# Patient Record
Sex: MALE | Race: WHITE | NOT HISPANIC OR LATINO
[De-identification: names, ages, dates, MRNs, and addresses within clinical notes are randomized per-mention and may not be internally consistent; named-entity substitution may affect disease eponyms.]

---

## 2019-06-13 ENCOUNTER — APPOINTMENT (OUTPATIENT)
Dept: INTERNAL MEDICINE | Facility: CLINIC | Age: 24
End: 2019-06-13

## 2019-06-17 ENCOUNTER — NON-APPOINTMENT (OUTPATIENT)
Age: 24
End: 2019-06-17

## 2019-06-17 ENCOUNTER — APPOINTMENT (OUTPATIENT)
Dept: INTERNAL MEDICINE | Facility: CLINIC | Age: 24
End: 2019-06-17
Payer: MEDICAID

## 2019-06-17 VITALS
HEIGHT: 69 IN | SYSTOLIC BLOOD PRESSURE: 126 MMHG | DIASTOLIC BLOOD PRESSURE: 74 MMHG | TEMPERATURE: 98.1 F | WEIGHT: 248 LBS | BODY MASS INDEX: 36.73 KG/M2 | HEART RATE: 81 BPM | RESPIRATION RATE: 14 BRPM | OXYGEN SATURATION: 97 %

## 2019-06-17 DIAGNOSIS — Z78.9 OTHER SPECIFIED HEALTH STATUS: ICD-10-CM

## 2019-06-17 DIAGNOSIS — Z82.49 FAMILY HISTORY OF ISCHEMIC HEART DISEASE AND OTHER DISEASES OF THE CIRCULATORY SYSTEM: ICD-10-CM

## 2019-06-17 DIAGNOSIS — Z83.438 FAMILY HISTORY OF OTHER DISORDER OF LIPOPROTEIN METABOLISM AND OTHER LIPIDEMIA: ICD-10-CM

## 2019-06-17 DIAGNOSIS — J06.9 ACUTE UPPER RESPIRATORY INFECTION, UNSPECIFIED: ICD-10-CM

## 2019-06-17 DIAGNOSIS — J02.9 ACUTE PHARYNGITIS, UNSPECIFIED: ICD-10-CM

## 2019-06-17 DIAGNOSIS — Z82.5 FAMILY HISTORY OF ASTHMA AND OTHER CHRONIC LOWER RESPIRATORY DISEASES: ICD-10-CM

## 2019-06-17 DIAGNOSIS — Z81.8 FAMILY HISTORY OF OTHER MENTAL AND BEHAVIORAL DISORDERS: ICD-10-CM

## 2019-06-17 DIAGNOSIS — Z80.1 FAMILY HISTORY OF MALIGNANT NEOPLASM OF TRACHEA, BRONCHUS AND LUNG: ICD-10-CM

## 2019-06-17 DIAGNOSIS — Z80.8 FAMILY HISTORY OF MALIGNANT NEOPLASM OF OTHER ORGANS OR SYSTEMS: ICD-10-CM

## 2019-06-17 LAB — S PYO AG SPEC QL IA: NORMAL

## 2019-06-17 PROCEDURE — 93000 ELECTROCARDIOGRAM COMPLETE: CPT

## 2019-06-17 PROCEDURE — 99213 OFFICE O/P EST LOW 20 MIN: CPT | Mod: 25

## 2019-06-17 PROCEDURE — 36415 COLL VENOUS BLD VENIPUNCTURE: CPT

## 2019-06-17 PROCEDURE — 99385 PREV VISIT NEW AGE 18-39: CPT | Mod: 25

## 2019-06-17 PROCEDURE — 87880 STREP A ASSAY W/OPTIC: CPT | Mod: QW

## 2019-06-17 NOTE — ASSESSMENT
[FreeTextEntry1] : asthma: Controlled. On albuterol inhaler. \par Anxiety, depression: Denies SI. On lamictal, lexapro. Check EKG. \par URI: Rapid strep negative. Most likely viral. Recommended advil cold and sinus PRN. \par HCM: Check labs. Declined HIV. Requested immunizations for review. \par

## 2019-06-17 NOTE — HISTORY OF PRESENT ILLNESS
[Initial Evaluation, This Episode] : an initial evaluation of this episode of [Nasal Congestion] : nasal congestion [Runny Nose] : runny nose [Sore Throat] : sore throat [Dry Cough] : dry cough [Gradual] : gradual [___ Day(s) Ago] : [unfilled] day(s) ago [Frequent] : frequently [Daily] : occur daily [Moderate] : moderate in severity [Worsening] : worsening [FreeTextEntry1] : CPE and rhinorrhea [Fever] : no fever [Nausea] : no nausea [Chills] : no chills [Myalgias] : no myalgias [Vomiting] : no vomiting [Diarrhea] : no diarrhea

## 2019-06-17 NOTE — PHYSICAL EXAM
[No Acute Distress] : no acute distress [Well Nourished] : well nourished [Well Developed] : well developed [Well-Appearing] : well-appearing [PERRL] : pupils equal round and reactive to light [Normal Sclera/Conjunctiva] : normal sclera/conjunctiva [EOMI] : extraocular movements intact [Normal Outer Ear/Nose] : the outer ears and nose were normal in appearance [Supple] : supple [No Lymphadenopathy] : no lymphadenopathy [Thyroid Normal, No Nodules] : the thyroid was normal and there were no nodules present [No Respiratory Distress] : no respiratory distress  [No Accessory Muscle Use] : no accessory muscle use [Clear to Auscultation] : lungs were clear to auscultation bilaterally [Normal Rate] : normal rate  [Regular Rhythm] : with a regular rhythm [Normal S1, S2] : normal S1 and S2 [No Murmur] : no murmur heard [No Edema] : there was no peripheral edema [Soft] : abdomen soft [Non Tender] : non-tender [Non-distended] : non-distended [Normal Posterior Cervical Nodes] : no posterior cervical lymphadenopathy [Normal Bowel Sounds] : normal bowel sounds [Normal Anterior Cervical Nodes] : no anterior cervical lymphadenopathy [No CVA Tenderness] : no CVA  tenderness [No Spinal Tenderness] : no spinal tenderness [No Joint Swelling] : no joint swelling [Grossly Normal Strength/Tone] : grossly normal strength/tone [No Rash] : no rash [Normal Gait] : normal gait [Coordination Grossly Intact] : coordination grossly intact [No Focal Deficits] : no focal deficits [Deep Tendon Reflexes (DTR)] : deep tendon reflexes were 2+ and symmetric [Normal Affect] : the affect was normal [Normal Insight/Judgement] : insight and judgment were intact [de-identified] : PND

## 2019-06-17 NOTE — REVIEW OF SYSTEMS
[Headache] : headache [Nasal Discharge] : nasal discharge [Cough] : cough [Depression] : depression [Anxiety] : anxiety [Fever] : no fever [Discharge] : no discharge [Night Sweats] : no night sweats [Chills] : no chills [Earache] : no earache [Vision Problems] : no vision problems [Itching] : no itching [Sore Throat] : no sore throat [Palpitations] : no palpitations [Chest Pain] : no chest pain [Shortness Of Breath] : no shortness of breath [Lower Ext Edema] : no lower extremity edema [Wheezing] : no wheezing [Dyspnea on Exertion] : not dyspnea on exertion [Nausea] : no nausea [Abdominal Pain] : no abdominal pain [Constipation] : no constipation [Diarrhea] : no diarrhea [Vomiting] : no vomiting [Muscle Weakness] : no muscle weakness [Dysuria] : no dysuria [Muscle Pain] : no muscle pain [Skin Rash] : no skin rash [Mole Changes] : no mole changes [Dizziness] : no dizziness [Suicidal] : not suicidal [Swollen Glands] : no swollen glands [Easy Bruising] : no easy bruising [Easy Bleeding] : no easy bleeding

## 2019-06-17 NOTE — HEALTH RISK ASSESSMENT
[Good] : ~his/her~  mood as  good [No falls in past year] : Patient reported no falls in the past year [0] : 2) Feeling down, depressed, or hopeless: Not at all (0) [Student] : student [Single] : single [Fully functional (using the telephone, shopping, preparing meals, housekeeping, doing laundry, using] : Fully functional and needs no help or supervision to perform IADLs (using the telephone, shopping, preparing meals, housekeeping, doing laundry, using transportation, managing medications and managing finances) [Fully functional (bathing, dressing, toileting, transferring, walking, feeding)] : Fully functional (bathing, dressing, toileting, transferring, walking, feeding) [Smoke Detector] : smoke detector [Seat Belt] :  uses seat belt [Carbon Monoxide Detector] : carbon monoxide detector [[id number]] : 0 [] : No [Change in mental status noted] : No change in mental status noted [HIV test declined] : HIV test declined [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

## 2019-06-18 ENCOUNTER — RESULT REVIEW (OUTPATIENT)
Age: 24
End: 2019-06-18

## 2019-06-18 LAB
ALBUMIN SERPL ELPH-MCNC: 4.8 G/DL
ALP BLD-CCNC: 61 U/L
ALT SERPL-CCNC: 63 U/L
ANION GAP SERPL CALC-SCNC: 14 MMOL/L
AST SERPL-CCNC: 26 U/L
BASOPHILS # BLD AUTO: 0.16 K/UL
BASOPHILS NFR BLD AUTO: 1.7 %
BILIRUB SERPL-MCNC: 0.7 MG/DL
BUN SERPL-MCNC: 17 MG/DL
CALCIUM SERPL-MCNC: 10.3 MG/DL
CHLORIDE SERPL-SCNC: 102 MMOL/L
CHOLEST SERPL-MCNC: 180 MG/DL
CHOLEST/HDLC SERPL: 4.9 RATIO
CO2 SERPL-SCNC: 24 MMOL/L
CREAT SERPL-MCNC: 1.12 MG/DL
EOSINOPHIL # BLD AUTO: 0.88 K/UL
EOSINOPHIL NFR BLD AUTO: 9.3 %
ESTIMATED AVERAGE GLUCOSE: 126 MG/DL
GLUCOSE SERPL-MCNC: 94 MG/DL
HBA1C MFR BLD HPLC: 6 %
HCT VFR BLD CALC: 50.1 %
HDLC SERPL-MCNC: 37 MG/DL
HGB BLD-MCNC: 16.4 G/DL
IMM GRANULOCYTES NFR BLD AUTO: 0.4 %
LDLC SERPL CALC-MCNC: 88 MG/DL
LYMPHOCYTES # BLD AUTO: 3.62 K/UL
LYMPHOCYTES NFR BLD AUTO: 38.2 %
MAN DIFF?: NORMAL
MCHC RBC-ENTMCNC: 27.7 PG
MCHC RBC-ENTMCNC: 32.7 GM/DL
MCV RBC AUTO: 84.6 FL
MONOCYTES # BLD AUTO: 0.73 K/UL
MONOCYTES NFR BLD AUTO: 7.7 %
NEUTROPHILS # BLD AUTO: 4.05 K/UL
NEUTROPHILS NFR BLD AUTO: 42.7 %
PLATELET # BLD AUTO: 402 K/UL
POTASSIUM SERPL-SCNC: 4.9 MMOL/L
PROT SERPL-MCNC: 7.6 G/DL
RBC # BLD: 5.92 M/UL
RBC # FLD: 12.5 %
SODIUM SERPL-SCNC: 140 MMOL/L
TRIGL SERPL-MCNC: 274 MG/DL
TSH SERPL-ACNC: 2.06 UIU/ML
WBC # FLD AUTO: 9.48 K/UL

## 2020-04-06 ENCOUNTER — TRANSCRIPTION ENCOUNTER (OUTPATIENT)
Age: 25
End: 2020-04-06

## 2020-04-06 ENCOUNTER — APPOINTMENT (OUTPATIENT)
Dept: INTERNAL MEDICINE | Facility: CLINIC | Age: 25
End: 2020-04-06
Payer: COMMERCIAL

## 2020-04-06 PROCEDURE — 99213 OFFICE O/P EST LOW 20 MIN: CPT

## 2020-04-06 NOTE — REVIEW OF SYSTEMS
[Negative] : Psychiatric [Fever] : no fever [Chills] : no chills [Fatigue] : no fatigue [Night Sweats] : no night sweats [Recent Change In Weight] : ~T no recent weight change [Cough] : no cough [FreeTextEntry6] : chest feels tight like he can't get a deep breath

## 2020-04-06 NOTE — HISTORY OF PRESENT ILLNESS
[Home] : at home, [unfilled] , at the time of the visit. [Medical Office: ([facility])___] : at ~his/her~ medical office located in [place] [Mother] : mother [Patient] : the patient [FreeTextEntry8] : c/o 3 days of feeling like he can't get a deep breath. No fever or cough. He works at [employer] and has been wearing a mask all day which  makes it harder to breathe. No calf swelling, palpitations, diaphoresis. No FH heart disease. Nonsmoker.  He used his mother's albuterol inhaler and helped for a little while. \par \par he has elevated ALT and was not retested because he was told to hold tylenol and he takes it 2-3 times a week for headaches.

## 2020-04-06 NOTE — ASSESSMENT
[FreeTextEntry1] : asthma\par will send rx for albuterol inhaler\par he wants to be evaluated at [facility] and could not get in today, but will try tomorrow\par stay home from work today and tomorrow\par \par elevated LFT's\par advised to f/u with Dr De La Cruz when things improve\par \par anxiety and depression\par continue meds

## 2020-04-06 NOTE — PHYSICAL EXAM
[No Respiratory Distress] : no respiratory distress  [No Accessory Muscle Use] : no accessory muscle use [Normal] : affect was normal and insight and judgment were intact

## 2020-04-20 ENCOUNTER — APPOINTMENT (OUTPATIENT)
Dept: INTERNAL MEDICINE | Facility: CLINIC | Age: 25
End: 2020-04-20
Payer: COMMERCIAL

## 2020-04-20 PROCEDURE — 99441: CPT

## 2020-04-20 RX ORDER — AZITHROMYCIN 250 MG/1
250 TABLET, FILM COATED ORAL
Qty: 1 | Refills: 0 | Status: DISCONTINUED | COMMUNITY
Start: 2020-04-10 | End: 2020-04-20

## 2020-04-20 RX ORDER — MONTELUKAST 10 MG/1
10 TABLET, FILM COATED ORAL
Qty: 30 | Refills: 5 | Status: ACTIVE | COMMUNITY
Start: 2020-04-20 | End: 1900-01-01

## 2020-04-22 ENCOUNTER — APPOINTMENT (OUTPATIENT)
Dept: PULMONOLOGY | Facility: CLINIC | Age: 25
End: 2020-04-22
Payer: COMMERCIAL

## 2020-04-22 PROCEDURE — 99204 OFFICE O/P NEW MOD 45 MIN: CPT | Mod: 95

## 2020-04-24 ENCOUNTER — APPOINTMENT (OUTPATIENT)
Dept: PULMONOLOGY | Facility: CLINIC | Age: 25
End: 2020-04-24
Payer: COMMERCIAL

## 2020-04-24 ENCOUNTER — RX CHANGE (OUTPATIENT)
Age: 25
End: 2020-04-24

## 2020-04-24 PROCEDURE — 99203 OFFICE O/P NEW LOW 30 MIN: CPT | Mod: 95

## 2020-04-24 RX ORDER — MOMETASONE FUROATE 100 UG/1
100 AEROSOL RESPIRATORY (INHALATION) TWICE DAILY
Qty: 1 | Refills: 0 | Status: DISCONTINUED | COMMUNITY
Start: 2020-04-08 | End: 2020-04-24

## 2020-04-24 RX ORDER — AZELASTINE HYDROCHLORIDE 205.5 UG/1
0.15 SPRAY, METERED NASAL DAILY
Qty: 30 | Refills: 1 | Status: DISCONTINUED | COMMUNITY
Start: 2020-04-24 | End: 2020-04-24

## 2020-04-24 NOTE — HISTORY OF PRESENT ILLNESS
[Home] : at home, [unfilled] , at the time of the visit. [Other Location: e.g. Home (Enter Location, City,State)___] : at [unfilled] [Patient] : the patient [Self] : self [TextBox_4] : [facility] pt [facility] Da Silva and Nickie NP\par \par \par 25-year-old gentleman with history of asthma, anxiety, he complains of mild shortness of breath for the last 3 weeks, he has no chest pain, cough, fever or chills, he does not complain of any nocturnal respiratory symptoms. \par  \par he is a nonsmoker, he is currenlt out of work (works at [employer]) Has one dog and one cat at home\par recently given zpack, medrol pack and using ventolin at least twice daily and pulmicort

## 2020-04-24 NOTE — DISCUSSION/SUMMARY
[FreeTextEntry1] : ---Assessment plan----------The patient has been referred here for further opinion regarding pulmonary problem,\par 26 yo w/h/o asthma, obesity \par 1) complete medrol pack the take prednisone 10mg\par 2) completed zpack\par 3) will need allergy labs- rx mailed to pt\par 4) needs CXR\par 5) change pulmicort to symbicort, continue ventolin/nebs as needed- reviewed proper inhaler technique\par 6)he has features of teresa- will need psg\par 7) h/o elevated LFTs- recheck labs\par 8) reviewed covid precautions\par 9) f/u in 10 days\par \par Thanks for allowing  me to participate  in the care of this patient.  Patient at this time  will follow  the above mentioned recommendations and return back for follow up visit. If you have any questions  I can be reached  at #[phone] (beeper)  or  [phone] (office).\par \par Steven Da Silva MD, FCCP \par Director, Pulmonary Hypertension Program \par [facility] \par Division of Pulmonary, Critical Care and Sleep Medicine \par  Professor of Medicine \par [employer]\par

## 2020-04-24 NOTE — REVIEW OF SYSTEMS
[Nasal Congestion] : nasal congestion [Cough] : cough [SOB on Exertion] : sob on exertion [Anxiety] : anxiety [Negative] : Endocrine

## 2020-04-27 ENCOUNTER — RX CHANGE (OUTPATIENT)
Age: 25
End: 2020-04-27

## 2020-04-27 RX ORDER — AZELASTINE HYDROCHLORIDE 205.5 UG/1
0.15 SPRAY, METERED NASAL DAILY
Qty: 30 | Refills: 1 | Status: DISCONTINUED | COMMUNITY
Start: 2020-04-24 | End: 2020-04-27

## 2020-05-04 ENCOUNTER — APPOINTMENT (OUTPATIENT)
Dept: PULMONOLOGY | Facility: CLINIC | Age: 25
End: 2020-05-04
Payer: COMMERCIAL

## 2020-05-04 PROCEDURE — 99443: CPT

## 2020-05-04 NOTE — DISCUSSION/SUMMARY
[FreeTextEntry1] : ---Assessment plan----------The patient has been referred here for further opinion regarding pulmonary problem,\par 26 yo w/h/o asthma, obesity \par 1) dyspnea-prednisone 15mg x few days then back to 10mg\par 2) completed zpack\par 3) will need allergy labs- rx mailed to pt\par 4) s/p cxr- will obtain result from [facility]\par 5) continue symbicort, continue ventolin/nebs as needed\par 6)he has features of teresa- will need psg\par 7) advised not to work at this time\par 8) reviewed covid precautions\par 9) f/u in 2 weeks\par \par Thanks for allowing  me to participate  in the care of this patient.  Patient at this time  will follow  the above mentioned recommendations and return back for follow up visit. If you have any questions  I can be reached  at #[phone] (beeper)  or  [phone] (office).\par \par Steven Da Silva MD, FCCP \par Director, Pulmonary Hypertension Program \par [facility] \par Division of Pulmonary, Critical Care and Sleep Medicine \par  Professor of Medicine \par [employer]\par

## 2020-05-04 NOTE — REVIEW OF SYSTEMS
[SOB on Exertion] : sob on exertion [Nasal Congestion] : nasal congestion [Cough] : cough [Anxiety] : anxiety [Negative] : Endocrine

## 2020-05-04 NOTE — HISTORY OF PRESENT ILLNESS
[Home] : at home, [unfilled] , at the time of the visit. [Other Location: e.g. Home (Enter Location, City,State)___] : at [unfilled] [Self] : self [Patient] : the patient [TextBox_4] : [facility] Dr Da Silva and Nickie NP\par \par \par 25-year-old gentleman with history of asthma, anxiety, he complains of mild shortness of breath for the last 3 weeks, he has no chest pain, cough, fever or chills, he does not complain of any nocturnal respiratory symptoms. \par  \par he is a nonsmoker, he is currenlt out of work (works at [employer]) Has one dog and one cat at home\par recently given zpack, medrol pack and using ventolin at least twice daily and pulmicort\par \par 5/4/2020 pt is feeling much better -breathing has improved but still having episodes of dyspnea

## 2020-05-08 ENCOUNTER — RX CHANGE (OUTPATIENT)
Age: 25
End: 2020-05-08

## 2020-05-08 RX ORDER — AZELASTINE HYDROCHLORIDE 205.5 UG/1
0.15 SPRAY, METERED NASAL
Qty: 1 | Refills: 1 | Status: DISCONTINUED | COMMUNITY
Start: 2020-05-08 | End: 2020-05-08

## 2020-05-08 RX ORDER — AZELASTINE HYDROCHLORIDE 205.5 UG/1
0.15 SPRAY, METERED NASAL
Qty: 30 | Refills: 1 | Status: DISCONTINUED | COMMUNITY
Start: 2020-04-27 | End: 2020-05-08

## 2020-05-12 ENCOUNTER — NON-APPOINTMENT (OUTPATIENT)
Age: 25
End: 2020-05-12

## 2020-05-18 NOTE — HISTORY OF PRESENT ILLNESS
[TextBox_4] : Victro Hugo is a pleasant 25-year-old gentleman with history of asthma, anxiety, he complains of mild shortness of breath for the last 3 weeks, he has no chest pain, cough, fever or chills, he does not complain of any nocturnal respiratory symptoms.\par \par \par This visit was provided via telehealth using real-time 2-way audio visual technology.  The patient, REMY SALES, was located at home, [address] [place] [address]\[place] [address] at the time of the visit.  \par The provider, Ciera Da Silva, was located at the office [address], [address], [place], [place] at the time of the visit. \par The patient, Mr. REMY SALES  and physician Ciera Da Silva DO, participated in the telehealth encounter.\par \par Verbal consent was obtained by the  from patient.\par

## 2020-05-18 NOTE — ASSESSMENT
[FreeTextEntry1] : I advised him to increase Pulmicort to 2 puffs twice a day\par I advised him to continue to use the Singulair\par I am starting him on Zpak and Medrol dose pack at this point\par I advised him to calm into the office for followup in 2 weeks

## 2020-05-18 NOTE — DISCUSSION/SUMMARY
[FreeTextEntry1] : Victor Hugo is a patient with mild shortness of breath, likely secondary to seasonal allergies and mild asthma exacerbation

## 2020-05-19 ENCOUNTER — APPOINTMENT (OUTPATIENT)
Dept: PULMONOLOGY | Facility: CLINIC | Age: 25
End: 2020-05-19

## 2020-05-19 ENCOUNTER — APPOINTMENT (OUTPATIENT)
Dept: PULMONOLOGY | Facility: CLINIC | Age: 25
End: 2020-05-19
Payer: COMMERCIAL

## 2020-05-19 PROCEDURE — 99443: CPT

## 2020-05-19 NOTE — HISTORY OF PRESENT ILLNESS
[Home] : at home, [unfilled] , at the time of the visit. [Other Location: e.g. Home (Enter Location, City,State)___] : at [unfilled] [Patient] : the patient [Self] : self [TextBox_4] : [facility] Da Silva and Nickie NP\par \par \par 25-year-old gentleman with history of asthma, anxiety, he complains of mild shortness of breath for the last 3 weeks, he has no chest pain, cough, fever or chills, he does not complain of any nocturnal respiratory symptoms. \par  \par he is a nonsmoker, he is currenlt out of work (works at [employer]) Has one dog and one cat at home\par recently given zpack, medrol pack and using ventolin at least twice daily and pulmicort\par \par 5/4/2020  -   pt is feeling much better -breathing has improved but still having episodes of dyspnea - \par \par may 19  -2020 -----  back to base line--on symbicort, proventil, singulair----needs labd

## 2020-05-19 NOTE — DISCUSSION/SUMMARY
[FreeTextEntry1] : ---Assessment plan----------The patient has been referred here for further opinion regarding pulmonary problem,\par 26 yo w/h/o asthma  well controlled  now, obesity , \par 1) dyspnea-improved ---- stop prednisone , contd symbicort \par 2) completed zpack\par 3) will need allergy labs- rx mailed to pt\par 4) s/p cxr- will obtain result from [facility]\par 5) continue symbicort, continue ventolin/nebs as needed\par 6)he has features of teresa- will need psg\par 7) advised not to work at this time\par 8) reviewed covid precautions \par 9) f/u in 8 weeks\par \par Thanks for allowing  me to participate  in the care of this patient.  Patient at this time  will follow  the above mentioned recommendations and return back for follow up visit. If you have any questions  I can be reached  at #[phone] (beeper)  or  [phone] (office).\par \par Steven Da Silva MD, FCCP \par Director, Pulmonary Hypertension Program \par [facility] \par Division of Pulmonary, Critical Care and Sleep Medicine \par  Professor of Medicine \par [employer] [employer]\par

## 2020-05-21 RX ORDER — BUDESONIDE AND FORMOTEROL FUMARATE DIHYDRATE 80; 4.5 UG/1; UG/1
80-4.5 AEROSOL RESPIRATORY (INHALATION) TWICE DAILY
Qty: 1 | Refills: 1 | Status: DISCONTINUED | COMMUNITY
Start: 2020-04-24 | End: 2020-05-21

## 2020-08-14 ENCOUNTER — APPOINTMENT (OUTPATIENT)
Dept: DISASTER EMERGENCY | Facility: CLINIC | Age: 25
End: 2020-08-14

## 2020-08-14 DIAGNOSIS — Z01.818 ENCOUNTER FOR OTHER PREPROCEDURAL EXAMINATION: ICD-10-CM

## 2020-08-15 LAB — SARS-COV-2 N GENE NPH QL NAA+PROBE: NOT DETECTED

## 2020-08-17 ENCOUNTER — RX RENEWAL (OUTPATIENT)
Age: 25
End: 2020-08-17

## 2020-08-18 ENCOUNTER — APPOINTMENT (OUTPATIENT)
Dept: PULMONOLOGY | Facility: CLINIC | Age: 25
End: 2020-08-18
Payer: COMMERCIAL

## 2020-08-18 ENCOUNTER — LABORATORY RESULT (OUTPATIENT)
Age: 25
End: 2020-08-18

## 2020-08-18 VITALS
BODY MASS INDEX: 39.87 KG/M2 | TEMPERATURE: 97.8 F | HEART RATE: 66 BPM | WEIGHT: 254 LBS | HEIGHT: 67 IN | OXYGEN SATURATION: 99 %

## 2020-08-18 VITALS — SYSTOLIC BLOOD PRESSURE: 122 MMHG | HEART RATE: 71 BPM | RESPIRATION RATE: 16 BRPM | DIASTOLIC BLOOD PRESSURE: 79 MMHG

## 2020-08-18 DIAGNOSIS — R06.83 SNORING: ICD-10-CM

## 2020-08-18 LAB
ALBUMIN SERPL ELPH-MCNC: 4.9 G/DL
ALP BLD-CCNC: 57 U/L
ALT SERPL-CCNC: 50 U/L
ANION GAP SERPL CALC-SCNC: 14 MMOL/L
AST SERPL-CCNC: 27 U/L
BASOPHILS # BLD AUTO: 0.15 K/UL
BASOPHILS NFR BLD AUTO: 1.7 %
BILIRUB SERPL-MCNC: 0.6 MG/DL
BUN SERPL-MCNC: 22 MG/DL
CALCIUM SERPL-MCNC: 10.5 MG/DL
CHLORIDE SERPL-SCNC: 104 MMOL/L
CO2 SERPL-SCNC: 23 MMOL/L
CREAT SERPL-MCNC: 1.01 MG/DL
DEPRECATED KAPPA LC FREE/LAMBDA SER: 1.2 RATIO
EOSINOPHIL # BLD AUTO: 0.75 K/UL
EOSINOPHIL NFR BLD AUTO: 8.5 %
GLUCOSE SERPL-MCNC: 96 MG/DL
HCT VFR BLD CALC: 47.6 %
HGB BLD-MCNC: 15.6 G/DL
IGA SER QL IEP: 424 MG/DL
IGG SER QL IEP: 1066 MG/DL
IGM SER QL IEP: 43 MG/DL
IMM GRANULOCYTES NFR BLD AUTO: 0.1 %
KAPPA LC CSF-MCNC: 1.29 MG/DL
KAPPA LC SERPL-MCNC: 1.55 MG/DL
LYMPHOCYTES # BLD AUTO: 2.75 K/UL
LYMPHOCYTES NFR BLD AUTO: 31.3 %
MAN DIFF?: NORMAL
MCHC RBC-ENTMCNC: 27.5 PG
MCHC RBC-ENTMCNC: 32.8 GM/DL
MCV RBC AUTO: 83.8 FL
MONOCYTES # BLD AUTO: 0.71 K/UL
MONOCYTES NFR BLD AUTO: 8.1 %
NEUTROPHILS # BLD AUTO: 4.43 K/UL
NEUTROPHILS NFR BLD AUTO: 50.3 %
PLATELET # BLD AUTO: 364 K/UL
POTASSIUM SERPL-SCNC: 4.5 MMOL/L
PROT SERPL-MCNC: 7.3 G/DL
RBC # BLD: 5.68 M/UL
RBC # FLD: 12.7 %
SODIUM SERPL-SCNC: 140 MMOL/L
WBC # FLD AUTO: 8.8 K/UL

## 2020-08-18 PROCEDURE — 99215 OFFICE O/P EST HI 40 MIN: CPT | Mod: 25

## 2020-08-18 PROCEDURE — 36415 COLL VENOUS BLD VENIPUNCTURE: CPT

## 2020-08-18 PROCEDURE — 94726 PLETHYSMOGRAPHY LUNG VOLUMES: CPT

## 2020-08-18 PROCEDURE — ZZZZZ: CPT

## 2020-08-18 PROCEDURE — 94729 DIFFUSING CAPACITY: CPT

## 2020-08-18 PROCEDURE — 94010 BREATHING CAPACITY TEST: CPT

## 2020-08-18 RX ORDER — AZITHROMYCIN 250 MG/1
250 TABLET, FILM COATED ORAL
Qty: 6 | Refills: 0 | Status: DISCONTINUED | COMMUNITY
Start: 2020-04-22 | End: 2020-08-18

## 2020-08-18 NOTE — HISTORY OF PRESENT ILLNESS
[TextBox_4] : \par \par \par 25-year-old gentleman with history of asthma, anxiety, he complains of mild shortness of breath for the last 3 weeks, he has no chest pain, cough, fever or chills, he does not complain of any nocturnal respiratory symptoms. \par  \par he is a nonsmoker, he is currenlt out of work (works at [employer]) Has one dog and one cat at home\par recently given zpack, medrol pack and using ventolin at least twice daily and pulmicort\par \par 5/4/2020  -   pt is feeling much better -breathing has improved but still having episodes of dyspnea - \par \par may 19  -2020 -----  back to base line--on symbicort, proventil, singulair----needs labd\par \par pft 8/2020 normal lung volumes\par \par august 2020---   Feels much better---was  on Proventil, Singulair, Symbicort/Advair------------- since he has improved we will stop taking the inhaled steroids-----has not done the sleep study\par

## 2020-08-18 NOTE — DISCUSSION/SUMMARY
[FreeTextEntry1] : ---Assessment plan----------The patient has been referred here for further opinion regarding pulmonary problem,\par 26 yo w/h/o asthma  well controlled  now, obesity , \par 1) dyspnea-improved ---- stop prednisone , contd symbicort albuterol as needed\par 2) labs drawn in our office today\par 3) )he has features of teresa-awaiting sleep study\par 4) f/u in 3 months\par \par \par Thanks for allowing  me to participate  in the care of this patient.  Patient at this time  will follow  the above mentioned recommendations and return back for follow up visit. If you have any questions  I can be reached  at #[phone] (beeper)  or  [phone] (office).\par \par Steven Da Silva MD, FCCP \par Director, Pulmonary Hypertension Program \par [facility] \par Division of Pulmonary, Critical Care and Sleep Medicine \par  Professor of Medicine \par [employer]\par

## 2020-08-18 NOTE — PHYSICAL EXAM
[No Acute Distress] : no acute distress [IV] : Mallampati Class: IV [No Neck Mass] : no neck mass [Clear to Auscultation Bilaterally] : clear to auscultation bilaterally [Normal S1, S2] : normal s1, s2 [Benign] : benign [No Abnormalities] : no abnormalities [No Clubbing] : no clubbing [Normal Color/ Pigmentation] : normal color/ pigmentation [Oriented x3] : oriented x3 [No Focal Deficits] : no focal deficits

## 2020-08-19 LAB
SARS-COV-2 IGG SERPL IA-ACNC: 0.08 INDEX
SARS-COV-2 IGG SERPL QL IA: NEGATIVE

## 2020-08-20 LAB
M TB IFN-G BLD-IMP: NEGATIVE
QUANTIFERON TB PLUS MITOGEN MINUS NIL: >10 IU/ML
QUANTIFERON TB PLUS NIL: 0.01 IU/ML
QUANTIFERON TB PLUS TB1 MINUS NIL: 0 IU/ML
QUANTIFERON TB PLUS TB2 MINUS NIL: 0 IU/ML

## 2020-08-21 LAB — TOTAL IGE SMQN RAST: 508 KU/L

## 2020-08-24 LAB
ALTERN TENCAPG(M6): <2 MCG/ML
ASPER FUMCAPG(M3): 7 MCG/ML
AUREOBASCAPG(M12): 2.6 MCG/ML
MICROPOLYCAPG(M22): <2 MCG/ML
PENIC CHRYCAPG(M1): 5.3 MCG/ML
PHOMA BETAE IGG: <2 MCG/ML
THERMOCAPG(M23): 2.4 MCG/ML
TRICHODERMA VIRIDE IGG: <2 MCG/ML

## 2020-08-25 LAB
A ALTERNATA IGE QN: <0.1 KUA/L
A FUMIGATUS IGE QN: <0.1 KUA/L
BERMUDA GRASS IGE QN: 0.2 KUA/L
BOXELDER IGE QN: 0.71 KUA/L
C HERBARUM IGE QN: <0.1 KUA/L
CALIF WALNUT IGE QN: 1.12 KUA/L
CAT DANDER IGE QN: 29.5 KUA/L
CMN PIGWEED IGE QN: 0.14 KUA/L
COMMON RAGWEED IGE QN: 1.04 KUA/L
COTTONWOOD IGE QN: 0.35 KUA/L
D FARINAE IGE QN: 1.42 KUA/L
D PTERONYSS IGE QN: 1.58 KUA/L
DEPRECATED A ALTERNATA IGE RAST QL: 0
DEPRECATED A FUMIGATUS IGE RAST QL: 0
DEPRECATED BERMUDA GRASS IGE RAST QL: NORMAL
DEPRECATED BOXELDER IGE RAST QL: 2
DEPRECATED C HERBARUM IGE RAST QL: 0
DEPRECATED CAT DANDER IGE RAST QL: 4
DEPRECATED COMMON PIGWEED IGE RAST QL: NORMAL
DEPRECATED COMMON RAGWEED IGE RAST QL: 2
DEPRECATED COTTONWOOD IGE RAST QL: 1
DEPRECATED D FARINAE IGE RAST QL: 2
DEPRECATED D PTERONYSS IGE RAST QL: 2
DEPRECATED DOG DANDER IGE RAST QL: 4
DEPRECATED GOOSEFOOT IGE RAST QL: NORMAL
DEPRECATED LONDON PLANE IGE RAST QL: NORMAL
DEPRECATED MUGWORT IGE RAST QL: NORMAL
DEPRECATED P NOTATUM IGE RAST QL: 0
DEPRECATED RED CEDAR IGE RAST QL: 1
DEPRECATED ROACH IGE RAST QL: 1
DEPRECATED SHEEP SORREL IGE RAST QL: NORMAL
DEPRECATED SILVER BIRCH IGE RAST QL: 2
DEPRECATED TIMOTHY IGE RAST QL: 1
DEPRECATED WHITE ASH IGE RAST QL: 2
DEPRECATED WHITE OAK IGE RAST QL: 2
DOG DANDER IGE QN: 30.4 KUA/L
GOOSEFOOT IGE QN: 0.33 KUA/L
LONDON PLANE IGE QN: 0.23 KUA/L
MUGWORT IGE QN: 0.33 KUA/L
MULBERRY (T70) CLASS: NORMAL
MULBERRY (T70) CONC: 0.19 KUA/L
P NOTATUM IGE QN: <0.1 KUA/L
RED CEDAR IGE QN: 0.37 KUA/L
ROACH IGE QN: 0.57 KUA/L
SHEEP SORREL IGE QN: 0.18 KUA/L
SILVER BIRCH IGE QN: 1.27 KUA/L
TIMOTHY IGE QN: 0.58 KUA/L
TREE ALLERG MIX1 IGE QL: 2
WHITE ASH IGE QN: 0.77 KUA/L
WHITE ELM IGE QN: 0.71 KUA/L
WHITE ELM IGE QN: 2
WHITE OAK IGE QN: 2.7 KUA/L

## 2020-09-18 ENCOUNTER — TRANSCRIPTION ENCOUNTER (OUTPATIENT)
Age: 25
End: 2020-09-18

## 2020-10-04 ENCOUNTER — TRANSCRIPTION ENCOUNTER (OUTPATIENT)
Age: 25
End: 2020-10-04

## 2020-10-09 ENCOUNTER — APPOINTMENT (OUTPATIENT)
Dept: NEUROLOGY | Facility: CLINIC | Age: 25
End: 2020-10-09
Payer: COMMERCIAL

## 2020-10-09 VITALS
HEIGHT: 67 IN | SYSTOLIC BLOOD PRESSURE: 135 MMHG | DIASTOLIC BLOOD PRESSURE: 87 MMHG | WEIGHT: 250 LBS | HEART RATE: 76 BPM | BODY MASS INDEX: 39.24 KG/M2

## 2020-10-09 VITALS — TEMPERATURE: 93.4 F

## 2020-10-09 PROCEDURE — 99203 OFFICE O/P NEW LOW 30 MIN: CPT

## 2020-10-09 RX ORDER — METHYLPREDNISOLONE 4 MG/1
4 TABLET ORAL
Qty: 1 | Refills: 0 | Status: DISCONTINUED | COMMUNITY
Start: 2020-04-22 | End: 2020-10-09

## 2020-10-09 RX ORDER — ESCITALOPRAM OXALATE 10 MG/1
10 TABLET, FILM COATED ORAL
Refills: 0 | Status: DISCONTINUED | COMMUNITY
End: 2020-10-09

## 2020-10-09 RX ORDER — PREDNISONE 5 MG/1
5 TABLET ORAL DAILY
Qty: 90 | Refills: 0 | Status: DISCONTINUED | COMMUNITY
Start: 2020-04-24 | End: 2020-10-09

## 2020-10-09 RX ORDER — LAMOTRIGINE 100 MG/1
100 TABLET ORAL
Refills: 0 | Status: DISCONTINUED | COMMUNITY
End: 2020-10-09

## 2020-10-09 NOTE — HISTORY OF PRESENT ILLNESS
[FreeTextEntry1] : Anirudh has a history of anxiety and asthma. He is being evaluated for headaches. \par \par He reports 8 days of headaches. He says it would start with a pain around the left eye and spread over left side of his head. The pain sometimes travel across to other side of his head. \par He was seen in urgent care and got Toradol shot and it helped his pain. \par He has been nauseous, he has vomited once during the headache. He has been light sensitive. \par The headache is now down to a 3-5/10.\par \par He reports a hx of migraines. He would get migraines "maybe once a month".\par \par  - - Headache History - - \par Onset (age/year): since age 12\par Location/radiation: "localized", at back of head or temporal areas\par Quality: pressure, throbbing\par Duration: 1-2 hours\par Frequency: once a month\par Aura: visual - tunneling vision (only once)\par            Motor - no\par            Sensory - no\par            Other-\par Associated symptoms: YES Photophobia, YES Phonophobia, YES nausea, YES vomiting, NO dizziness, NO autonomic symptoms, NO Floaters, NO blurred vision\par Relieving factors: "cool breeze", anti inflammatory \par Triggers/Aggravating factors: "I can't say" \par Current treatment\par                     abortive: Tylenol and Advil\par                     preventative: none\par Medications tried/failed: no\par Imaging studies: CT head 2006 normal\par Allergies: seasonal allergies, gets allergy shots every week\par

## 2020-10-18 ENCOUNTER — RX RENEWAL (OUTPATIENT)
Age: 25
End: 2020-10-18

## 2020-10-20 ENCOUNTER — NON-APPOINTMENT (OUTPATIENT)
Age: 25
End: 2020-10-20

## 2020-10-20 RX ORDER — SUMATRIPTAN 25 MG/1
25 TABLET, FILM COATED ORAL
Qty: 20 | Refills: 1 | Status: DISCONTINUED | COMMUNITY
Start: 2020-10-12 | End: 2020-10-20

## 2020-11-06 ENCOUNTER — APPOINTMENT (OUTPATIENT)
Dept: NEUROLOGY | Facility: CLINIC | Age: 25
End: 2020-11-06

## 2020-11-10 ENCOUNTER — APPOINTMENT (OUTPATIENT)
Dept: PULMONOLOGY | Facility: CLINIC | Age: 25
End: 2020-11-10

## 2020-11-19 ENCOUNTER — APPOINTMENT (OUTPATIENT)
Dept: NEUROLOGY | Facility: CLINIC | Age: 25
End: 2020-11-19

## 2021-02-08 ENCOUNTER — APPOINTMENT (OUTPATIENT)
Dept: NEUROLOGY | Facility: CLINIC | Age: 26
End: 2021-02-08

## 2021-04-26 ENCOUNTER — RX RENEWAL (OUTPATIENT)
Age: 26
End: 2021-04-26

## 2021-05-03 ENCOUNTER — TRANSCRIPTION ENCOUNTER (OUTPATIENT)
Age: 26
End: 2021-05-03

## 2021-06-02 ENCOUNTER — TRANSCRIPTION ENCOUNTER (OUTPATIENT)
Age: 26
End: 2021-06-02

## 2021-06-06 ENCOUNTER — TRANSCRIPTION ENCOUNTER (OUTPATIENT)
Age: 26
End: 2021-06-06

## 2021-06-24 ENCOUNTER — APPOINTMENT (OUTPATIENT)
Dept: INTERNAL MEDICINE | Facility: CLINIC | Age: 26
End: 2021-06-24
Payer: COMMERCIAL

## 2021-06-24 VITALS
HEIGHT: 67 IN | SYSTOLIC BLOOD PRESSURE: 124 MMHG | TEMPERATURE: 96.2 F | HEART RATE: 88 BPM | DIASTOLIC BLOOD PRESSURE: 82 MMHG | OXYGEN SATURATION: 98 % | BODY MASS INDEX: 42.06 KG/M2 | WEIGHT: 268 LBS | RESPIRATION RATE: 14 BRPM

## 2021-06-24 DIAGNOSIS — F41.9 ANXIETY DISORDER, UNSPECIFIED: ICD-10-CM

## 2021-06-24 DIAGNOSIS — G43.011 MIGRAINE W/OUT AURA, INTRACTABLE, WITH STATUS MIGRAINOSUS: ICD-10-CM

## 2021-06-24 DIAGNOSIS — F32.9 ANXIETY DISORDER, UNSPECIFIED: ICD-10-CM

## 2021-06-24 DIAGNOSIS — Z87.898 PERSONAL HISTORY OF OTHER SPECIFIED CONDITIONS: ICD-10-CM

## 2021-06-24 PROCEDURE — 99395 PREV VISIT EST AGE 18-39: CPT

## 2021-06-24 PROCEDURE — 99072 ADDL SUPL MATRL&STAF TM PHE: CPT

## 2021-06-24 RX ORDER — ALBUTEROL SULFATE 2.5 MG/3ML
(2.5 MG/3ML) SOLUTION RESPIRATORY (INHALATION)
Qty: 300 | Refills: 0 | Status: COMPLETED | COMMUNITY
Start: 2020-04-24 | End: 2021-06-24

## 2021-06-24 RX ORDER — PROCHLORPERAZINE MALEATE 10 MG/1
10 TABLET ORAL EVERY 8 HOURS
Qty: 9 | Refills: 0 | Status: COMPLETED | COMMUNITY
Start: 2020-10-09 | End: 2021-06-24

## 2021-06-24 NOTE — HEALTH RISK ASSESSMENT
[Good] : ~his/her~ current health as good [Fair] :  ~his/her~ mood as fair [] : No [Change in mental status noted] : No change in mental status noted [Fully functional (bathing, dressing, toileting, transferring, walking, feeding)] : Fully functional (bathing, dressing, toileting, transferring, walking, feeding) [Fully functional (using the telephone, shopping, preparing meals, housekeeping, doing laundry, using] : Fully functional and needs no help or supervision to perform IADLs (using the telephone, shopping, preparing meals, housekeeping, doing laundry, using transportation, managing medications and managing finances) [Reports changes in hearing] : Reports no changes in hearing [Reports changes in vision] : Reports no changes in vision

## 2021-06-24 NOTE — REVIEW OF SYSTEMS

## 2021-06-24 NOTE — COUNSELING
[Structured Weight Management Program suggested:] : Structured weight management program suggested [FreeTextEntry1] : [facility] weight management

## 2021-06-24 NOTE — ASSESSMENT
[FreeTextEntry1] : Anxiety, depression: Discussed bupropion for depression and possible weight benefits. He will consider. \par Asthma: Controlled on singulair and albuterol. \par HCM: Check labs. Declined HIV. Will discuss results. \par

## 2021-06-29 ENCOUNTER — TRANSCRIPTION ENCOUNTER (OUTPATIENT)
Age: 26
End: 2021-06-29

## 2021-06-29 LAB
ALBUMIN SERPL ELPH-MCNC: 4.5 G/DL
ALP BLD-CCNC: 63 U/L
ALT SERPL-CCNC: 51 U/L
ANION GAP SERPL CALC-SCNC: 13 MMOL/L
AST SERPL-CCNC: 27 U/L
BASOPHILS # BLD AUTO: 0.09 K/UL
BASOPHILS NFR BLD AUTO: 1 %
BILIRUB SERPL-MCNC: 0.7 MG/DL
BUN SERPL-MCNC: 16 MG/DL
CALCIUM SERPL-MCNC: 9.5 MG/DL
CHLORIDE SERPL-SCNC: 103 MMOL/L
CHOLEST SERPL-MCNC: 193 MG/DL
CO2 SERPL-SCNC: 23 MMOL/L
CREAT SERPL-MCNC: 1.08 MG/DL
EOSINOPHIL # BLD AUTO: 0.75 K/UL
EOSINOPHIL NFR BLD AUTO: 8.4 %
ESTIMATED AVERAGE GLUCOSE: 134 MG/DL
GLUCOSE SERPL-MCNC: 129 MG/DL
HBA1C MFR BLD HPLC: 6.3 %
HCT VFR BLD CALC: 48.3 %
HDLC SERPL-MCNC: 31 MG/DL
HGB BLD-MCNC: 15.3 G/DL
IMM GRANULOCYTES NFR BLD AUTO: 0.6 %
LDLC SERPL CALC-MCNC: NORMAL MG/DL
LYMPHOCYTES # BLD AUTO: 3.01 K/UL
LYMPHOCYTES NFR BLD AUTO: 33.9 %
MAN DIFF?: NORMAL
MCHC RBC-ENTMCNC: 28.3 PG
MCHC RBC-ENTMCNC: 31.7 GM/DL
MCV RBC AUTO: 89.3 FL
MONOCYTES # BLD AUTO: 0.76 K/UL
MONOCYTES NFR BLD AUTO: 8.5 %
NEUTROPHILS # BLD AUTO: 4.23 K/UL
NEUTROPHILS NFR BLD AUTO: 47.6 %
NONHDLC SERPL-MCNC: 162 MG/DL
PLATELET # BLD AUTO: 379 K/UL
POTASSIUM SERPL-SCNC: 4.2 MMOL/L
PROT SERPL-MCNC: 7 G/DL
RBC # BLD: 5.41 M/UL
RBC # FLD: 12.8 %
SODIUM SERPL-SCNC: 139 MMOL/L
TRIGL SERPL-MCNC: 575 MG/DL
TSH SERPL-ACNC: 2.31 UIU/ML
WBC # FLD AUTO: 8.89 K/UL

## 2021-07-02 ENCOUNTER — OUTPATIENT (OUTPATIENT)
Dept: OUTPATIENT SERVICES | Facility: HOSPITAL | Age: 26
LOS: 1 days | End: 2021-07-02
Payer: COMMERCIAL

## 2021-07-02 ENCOUNTER — APPOINTMENT (OUTPATIENT)
Dept: RADIOLOGY | Facility: CLINIC | Age: 26
End: 2021-07-02
Payer: COMMERCIAL

## 2021-07-02 ENCOUNTER — APPOINTMENT (OUTPATIENT)
Dept: INTERNAL MEDICINE | Facility: CLINIC | Age: 26
End: 2021-07-02
Payer: COMMERCIAL

## 2021-07-02 VITALS
BODY MASS INDEX: 42.06 KG/M2 | HEART RATE: 88 BPM | DIASTOLIC BLOOD PRESSURE: 84 MMHG | OXYGEN SATURATION: 97 % | HEIGHT: 67 IN | SYSTOLIC BLOOD PRESSURE: 132 MMHG | RESPIRATION RATE: 14 BRPM | TEMPERATURE: 97.5 F | WEIGHT: 268 LBS

## 2021-07-02 DIAGNOSIS — J45.909 UNSPECIFIED ASTHMA, UNCOMPLICATED: ICD-10-CM

## 2021-07-02 PROCEDURE — 71046 X-RAY EXAM CHEST 2 VIEWS: CPT

## 2021-07-02 PROCEDURE — 99214 OFFICE O/P EST MOD 30 MIN: CPT

## 2021-07-02 PROCEDURE — 99072 ADDL SUPL MATRL&STAF TM PHE: CPT

## 2021-07-02 PROCEDURE — 71046 X-RAY EXAM CHEST 2 VIEWS: CPT | Mod: 26

## 2021-07-02 RX ORDER — FLUTICASONE PROPIONATE AND SALMETEROL 113; 14 UG/1; UG/1
113-14 POWDER, METERED RESPIRATORY (INHALATION) TWICE DAILY
Qty: 1 | Refills: 1 | Status: ACTIVE | COMMUNITY
Start: 2020-05-21 | End: 1900-01-01

## 2021-07-02 NOTE — REVIEW OF SYSTEMS
[Postnasal Drip] : postnasal drip [Shortness Of Breath] : shortness of breath [Wheezing] : wheezing [Cough] : cough [Negative] : Heme/Lymph [FreeTextEntry6] : congestion

## 2021-07-02 NOTE — END OF VISIT
[FreeTextEntry3] : "I, Dorinda Parada, personally scribed the services dictated to me by Dr. Armando Loja MD in this documentation on 07/02/2021 "\par \par "I Dr. Armando Loja MD, personally performed the services described in this documentation on 07/02/2021 for the patient as scribed by Dorinda Parada in my presence. I have reviewed and verified that all the information is accurate and true."\par \par

## 2021-07-02 NOTE — PLAN
[FreeTextEntry1] : Continue antibiotic \par Sent for X-Ray\par start Prednisone \par Continue cough medicine

## 2021-07-02 NOTE — PHYSICAL EXAM
[No Acute Distress] : no acute distress [Well Nourished] : well nourished [Well Developed] : well developed [Well-Appearing] : well-appearing [Normal Voice/Communication] : normal voice/communication [Normal Sclera/Conjunctiva] : normal sclera/conjunctiva [EOMI] : extraocular movements intact [PERRL] : pupils equal round and reactive to light [Normal Outer Ear/Nose] : the outer ears and nose were normal in appearance [Normal Oropharynx] : the oropharynx was normal [Normal TMs] : both tympanic membranes were normal [No JVD] : no jugular venous distention [No Lymphadenopathy] : no lymphadenopathy [Supple] : supple [Thyroid Normal, No Nodules] : the thyroid was normal and there were no nodules present [No Respiratory Distress] : no respiratory distress  [No Accessory Muscle Use] : no accessory muscle use [Scattered Wheezes] : scattered wheezing was heard [Normal Rate] : normal rate  [Regular Rhythm] : with a regular rhythm [Normal S1, S2] : normal S1 and S2 [No Murmur] : no murmur heard [No Carotid Bruits] : no carotid bruits [No Abdominal Bruit] : a ~M bruit was not heard ~T in the abdomen [No Varicosities] : no varicosities [Pedal Pulses Present] : the pedal pulses are present [No Edema] : there was no peripheral edema [No Palpable Aorta] : no palpable aorta [No Extremity Clubbing/Cyanosis] : no extremity clubbing/cyanosis [Soft] : abdomen soft [Non Tender] : non-tender [Non-distended] : non-distended [No Masses] : no abdominal mass palpated [No HSM] : no HSM [Normal Bowel Sounds] : normal bowel sounds [Normal Supraclavicular Nodes] : no supraclavicular lymphadenopathy [Normal Posterior Cervical Nodes] : no posterior cervical lymphadenopathy [Normal Anterior Cervical Nodes] : no anterior cervical lymphadenopathy [No CVA Tenderness] : no CVA  tenderness [No Spinal Tenderness] : no spinal tenderness [No Joint Swelling] : no joint swelling [Grossly Normal Strength/Tone] : grossly normal strength/tone [No Rash] : no rash [Coordination Grossly Intact] : coordination grossly intact [No Focal Deficits] : no focal deficits [Normal Gait] : normal gait [Deep Tendon Reflexes (DTR)] : deep tendon reflexes were 2+ and symmetric [Speech Grossly Normal] : speech grossly normal [Memory Grossly Normal] : memory grossly normal [Normal Affect] : the affect was normal [Alert and Oriented x3] : oriented to person, place, and time [Normal Mood] : the mood was normal [Normal Insight/Judgement] : insight and judgment were intact

## 2021-07-02 NOTE — HISTORY OF PRESENT ILLNESS
[Cold Symptoms] : cold symptoms [Moderate] : moderate [___ Days ago] : [unfilled] days ago [Episodic] : episodic  [Congestion] : congestion [Cough] : cough [Sore Throat] : sore throat [Wheezing] : wheezing [Chills] : chills [Shortness Of Breath] : shortness of breath [Stable] : stable [Anorexia] : no anorexia [Earache] : no earache [Fatigue] : not fatigue [Headache] : no headache [Fever] : no fever [FreeTextEntry8] : cough wheezing

## 2021-07-02 NOTE — HEALTH RISK ASSESSMENT
[Yes] : Yes [Monthly or less (1 pt)] : Monthly or less (1 point) [No falls in past year] : Patient reported no falls in the past year [0] : 2) Feeling down, depressed, or hopeless: Not at all (0) [PHQ-2 Negative - No further assessment needed] : PHQ-2 Negative - No further assessment needed [] : No [[id number]] : 0

## 2021-07-03 RX ORDER — ALBUTEROL SULFATE 2.5 MG/3ML
(2.5 MG/3ML) SOLUTION RESPIRATORY (INHALATION) EVERY 6 HOURS
Qty: 1 | Refills: 0 | Status: ACTIVE | COMMUNITY
Start: 2021-07-02 | End: 1900-01-01

## 2021-09-27 ENCOUNTER — RX RENEWAL (OUTPATIENT)
Age: 26
End: 2021-09-27

## 2021-12-31 ENCOUNTER — TRANSCRIPTION ENCOUNTER (OUTPATIENT)
Age: 26
End: 2021-12-31

## 2022-03-31 ENCOUNTER — TRANSCRIPTION ENCOUNTER (OUTPATIENT)
Age: 27
End: 2022-03-31

## 2022-04-05 ENCOUNTER — TRANSCRIPTION ENCOUNTER (OUTPATIENT)
Age: 27
End: 2022-04-05

## 2022-04-22 ENCOUNTER — APPOINTMENT (OUTPATIENT)
Dept: BARIATRICS/WEIGHT MGMT | Facility: CLINIC | Age: 27
End: 2022-04-22
Payer: COMMERCIAL

## 2022-04-22 VITALS — BODY MASS INDEX: 42.38 KG/M2 | WEIGHT: 270 LBS | HEIGHT: 67 IN

## 2022-04-22 DIAGNOSIS — R79.89 OTHER SPECIFIED ABNORMAL FINDINGS OF BLOOD CHEMISTRY: ICD-10-CM

## 2022-04-22 DIAGNOSIS — R73.03 PREDIABETES.: ICD-10-CM

## 2022-04-22 PROCEDURE — 99205 OFFICE O/P NEW HI 60 MIN: CPT | Mod: 95

## 2022-04-22 RX ORDER — BUPROPION HYDROCHLORIDE 150 MG/1
150 TABLET, EXTENDED RELEASE ORAL
Qty: 30 | Refills: 2 | Status: DISCONTINUED | COMMUNITY
Start: 2021-06-29 | End: 2022-04-22

## 2022-04-22 RX ORDER — PREDNISONE 20 MG/1
20 TABLET ORAL
Qty: 5 | Refills: 0 | Status: DISCONTINUED | COMMUNITY
Start: 2021-07-02 | End: 2022-04-22

## 2022-04-22 RX ORDER — FLUTICASONE PROPIONATE 50 UG/1
50 SPRAY, METERED NASAL DAILY
Qty: 16 | Refills: 2 | Status: DISCONTINUED | COMMUNITY
Start: 2020-05-08 | End: 2022-04-22

## 2022-04-22 RX ORDER — ALBUTEROL SULFATE 90 UG/1
108 (90 BASE) AEROSOL, METERED RESPIRATORY (INHALATION) 4 TIMES DAILY
Qty: 1 | Refills: 1 | Status: DISCONTINUED | COMMUNITY
End: 2022-04-22

## 2022-04-25 PROBLEM — R79.89 ELEVATED LFTS: Status: ACTIVE | Noted: 2019-06-18

## 2022-04-25 PROBLEM — R73.03 PRE-DIABETES: Status: ACTIVE | Noted: 2022-04-22

## 2022-04-25 NOTE — HISTORY OF PRESENT ILLNESS
[FreeTextEntry1] : This is a 26 year male  present in office today for initial weight management visit. PMH: obesity, anxiety/depression\par Was on bupropion for anxiety/depression, did not help and he did not see any weight loss with it. \par Was on lamictial and lexapro in the past as well, no meds for anxiety/depression right now \par \par \par Patient was referred to us by PCP\par He would like to lose weight, feels his weight worsens his asthma \par Reports recent "stomach issues" IBS?\par Goal weight 200 pounds \par He is at his heaviest weight 270 pounds \par Obese as a child \par \par Has attempted weight loss with diets, low calorie, keto \par He states he gets bored with food and will crave other things and come off the diet \par \par \par Patient works at [employer], active \par schedule will vary, works part time 35 hours a week \par \par Lives with mother, whom normally cooks. "She is trying to lose weight too"  \par He can Cook as well \par \par Breakfast: typically skips, leftovers or [employer]bucks \par Lunch: protein snack pack from [employer], salads, chicken tenders \par Dinner: greek salads, poke bowl with brown rice \par \par Eats out 2-3 days a week \par Can't bring food to work- refrigerator's are not well kept so he buys food from [employer] \par \par Has gym membership\par He has been walking twice a month - depends on asthma\par Tracks steps 2-3 miles a day on average  \par \par Sleeps poorly, has insomnia \par Will eat in the middle of the night if he's hungry. Last night at 130am had strawberries \par \par Social: rare Alcohol, denies cigarette and recreational drugs \par

## 2022-04-25 NOTE — ASSESSMENT
[FreeTextEntry1] : BARIATRIC SURGERY HISTORY: none \par OBESITY CO-MORBIDITIES: HLD, pre-dm\par ANTI-OBESITY MEDICATIONS: none\par OBESITY MEDICATION SIDE EFFECTS: n/a \par Was on bupropion for anxiety/depression, did not help and he did not see any weight loss with it.\par \par Plan: \par Reviewed a whole food high fiber diet, lean animal protein diet\par Avoid added fat, sugar, refined carbohdyrates\par Keep food journal \par No eating after dinner\par Start with breakfast\par 3 distinct meals \par continue monitoring steps, goal 10,000 a day \par encouraged daily physical activity, go to the gym regularly \par order ozempic 0.25mg q weekly \par ordered blood work \par \par f/u 2 weeks \par \par \par

## 2022-04-26 ENCOUNTER — APPOINTMENT (OUTPATIENT)
Dept: INTERNAL MEDICINE | Facility: CLINIC | Age: 27
End: 2022-04-26
Payer: COMMERCIAL

## 2022-04-26 VITALS
BODY MASS INDEX: 42.38 KG/M2 | HEART RATE: 104 BPM | SYSTOLIC BLOOD PRESSURE: 124 MMHG | RESPIRATION RATE: 15 BRPM | WEIGHT: 270 LBS | OXYGEN SATURATION: 98 % | DIASTOLIC BLOOD PRESSURE: 80 MMHG | HEIGHT: 67 IN | TEMPERATURE: 97.8 F

## 2022-04-26 DIAGNOSIS — M72.2 PLANTAR FASCIAL FIBROMATOSIS: ICD-10-CM

## 2022-04-26 PROCEDURE — 99213 OFFICE O/P EST LOW 20 MIN: CPT

## 2022-04-26 NOTE — REVIEW OF SYSTEMS
[Muscle Pain] : muscle pain [Fever] : no fever [Chills] : no chills [Joint Pain] : no joint pain [Skin Rash] : no skin rash

## 2022-04-26 NOTE — HISTORY OF PRESENT ILLNESS
[Initial Evaluation] : an initial evaluation of [Foot Pain] : foot pain [Foot Tenderness] : foot tenderness [Currently Experiencing] : The patient is currently experiencing symptoms. [Left Calcaneal Area] : left calcaneal area [Sharp] : sharp [Sudden] : sudden [___ Day(s) Ago] : [unfilled] day(s) ago [Intermittent] : intermittently [Moderate] : moderate in severity [Worsening] : worsening [Weight Bearing] : weight bearing [Foot Swelling] : no foot swelling [Foot Numbness] : no foot numbness

## 2022-04-26 NOTE — PHYSICAL EXAM
[No Acute Distress] : no acute distress [Grossly Normal Strength/Tone] : grossly normal strength/tone [No Rash] : no rash [Normal Gait] : normal gait [de-identified] : severe TTP left plantar fascia

## 2022-04-26 NOTE — ASSESSMENT
[FreeTextEntry1] : Planter fasciitis: Discussed naproxen PRN. Recommended he not take OTC analgesics while on naproxen. Recommended ice, daily stretches, and arch support. Follow-up 2 weeks if not improved. \par

## 2022-04-29 ENCOUNTER — APPOINTMENT (OUTPATIENT)
Dept: BARIATRICS/WEIGHT MGMT | Facility: CLINIC | Age: 27
End: 2022-04-29
Payer: COMMERCIAL

## 2022-04-29 DIAGNOSIS — E55.9 VITAMIN D DEFICIENCY, UNSPECIFIED: ICD-10-CM

## 2022-04-29 LAB
25(OH)D3 SERPL-MCNC: 7.9 NG/ML
ALBUMIN SERPL ELPH-MCNC: 4.5 G/DL
ALP BLD-CCNC: 59 U/L
ALT SERPL-CCNC: 60 U/L
ANION GAP SERPL CALC-SCNC: 12 MMOL/L
AST SERPL-CCNC: 30 U/L
BASOPHILS # BLD AUTO: 0.13 K/UL
BASOPHILS NFR BLD AUTO: 1.5 %
BILIRUB SERPL-MCNC: 0.7 MG/DL
BUN SERPL-MCNC: 18 MG/DL
CALCIUM SERPL-MCNC: 9.5 MG/DL
CHLORIDE SERPL-SCNC: 103 MMOL/L
CHOLEST SERPL-MCNC: 179 MG/DL
CO2 SERPL-SCNC: 25 MMOL/L
CREAT SERPL-MCNC: 0.9 MG/DL
CRP SERPL HS-MCNC: 2.33 MG/L
EGFR: 122 ML/MIN/1.73M2
EOSINOPHIL # BLD AUTO: 1.08 K/UL
EOSINOPHIL NFR BLD AUTO: 12.2 %
ESTIMATED AVERAGE GLUCOSE: 148 MG/DL
GLUCOSE SERPL-MCNC: 104 MG/DL
HBA1C MFR BLD HPLC: 6.8 %
HCT VFR BLD CALC: 47.5 %
HDLC SERPL-MCNC: 35 MG/DL
HGB BLD-MCNC: 15.7 G/DL
IMM GRANULOCYTES NFR BLD AUTO: 0.5 %
INSULIN P FAST SERPL-ACNC: 45.5 UU/ML
LDLC SERPL CALC-MCNC: 84 MG/DL
LYMPHOCYTES # BLD AUTO: 3.08 K/UL
LYMPHOCYTES NFR BLD AUTO: 34.7 %
MAN DIFF?: NORMAL
MCHC RBC-ENTMCNC: 28.3 PG
MCHC RBC-ENTMCNC: 33.1 GM/DL
MCV RBC AUTO: 85.7 FL
MONOCYTES # BLD AUTO: 0.6 K/UL
MONOCYTES NFR BLD AUTO: 6.8 %
NEUTROPHILS # BLD AUTO: 3.94 K/UL
NEUTROPHILS NFR BLD AUTO: 44.3 %
NONHDLC SERPL-MCNC: 144 MG/DL
PLATELET # BLD AUTO: 385 K/UL
POTASSIUM SERPL-SCNC: 5 MMOL/L
PROT SERPL-MCNC: 7 G/DL
RBC # BLD: 5.54 M/UL
RBC # FLD: 12.5 %
SODIUM SERPL-SCNC: 140 MMOL/L
T3FREE SERPL-MCNC: 3.42 PG/ML
T4 FREE SERPL-MCNC: 1 NG/DL
TRIGL SERPL-MCNC: 297 MG/DL
TSH SERPL-ACNC: 1.9 UIU/ML
WBC # FLD AUTO: 8.87 K/UL

## 2022-04-29 PROCEDURE — 99214 OFFICE O/P EST MOD 30 MIN: CPT | Mod: 95

## 2022-04-29 RX ORDER — CHOLECALCIFEROL (VITAMIN D3) 1250 MCG
1.25 MG CAPSULE ORAL
Qty: 12 | Refills: 0 | Status: ACTIVE | COMMUNITY
Start: 2022-04-29 | End: 1900-01-01

## 2022-04-29 NOTE — ASSESSMENT
[FreeTextEntry1] : BARIATRIC SURGERY HISTORY: none \par OBESITY CO-MORBIDITIES: HLD, dm\par ANTI-OBESITY MEDICATIONS: none\par OBESITY MEDICATION SIDE EFFECTS: n/a \par Was on bupropion for anxiety/depression, did not help and he did not see any weight loss with it.\par \par Plan: \par Continue whole food high fiber, lean animal protein\par Avoid added fat, sugar, refined carbohydrates\par Keep food journal \par No eating after dinner\par Start with breakfast\par 3 distinct meals \par discussed the importance of  daily physical activity\par establish a regular sleep schedule \par \par DM: HA1C 6.8 plus clinical symptoms \par order ozempic 0.25mg q weekly \par \par Vit D Deficiency:  ordered d3 50,000 units qweekly 12 weeks\par \par f/u 2 weeks \par

## 2022-04-29 NOTE — HISTORY OF PRESENT ILLNESS
[Home] : at home, [unfilled] , at the time of the visit. [Other Location: e.g. Home (Enter Location, City,State)___] : at [unfilled] [Verbal consent obtained from patient] : the patient, [unfilled] [FreeTextEntry1] : This is a 26 year old male  present for obesity followup visit. \par \par \par Patient did not weight himself today \par He has been trying to incorporate more whole food, lean animal protein \par Stopped drinking diet soda, drinking diet ice tea\par \par B: skips most days, leftovers, poke bowl \par L: sushi, chicken and brown rice with sauces\par D: fish, allergic to salmon with salmon, brown rice bowl with pork and tofu \par Snacking less, but will eat before bed, on leftovers\par \par Two "short" walks this past week. Hard to schedule because of work and events coming up \par Birthday is coming up and he has to drive his girlfriend around over the weekend for work  \par \par Reviewed blood work HA1C is now 6.8. Patient has noticed increased appetite, and urination but attributed it to drinking more ice tea

## 2022-05-02 ENCOUNTER — RX CHANGE (OUTPATIENT)
Age: 27
End: 2022-05-02

## 2022-05-06 ENCOUNTER — NON-APPOINTMENT (OUTPATIENT)
Age: 27
End: 2022-05-06

## 2022-05-13 ENCOUNTER — APPOINTMENT (OUTPATIENT)
Dept: BARIATRICS/WEIGHT MGMT | Facility: CLINIC | Age: 27
End: 2022-05-13
Payer: COMMERCIAL

## 2022-05-13 VITALS — HEIGHT: 67 IN | BODY MASS INDEX: 42.06 KG/M2 | WEIGHT: 268 LBS

## 2022-05-13 DIAGNOSIS — E66.01 MORBID (SEVERE) OBESITY DUE TO EXCESS CALORIES: ICD-10-CM

## 2022-05-13 DIAGNOSIS — E88.81 METABOLIC SYNDROME: ICD-10-CM

## 2022-05-13 PROCEDURE — 99214 OFFICE O/P EST MOD 30 MIN: CPT | Mod: 95

## 2022-05-17 PROBLEM — E66.01 MORBID OBESITY WITH BMI OF 40.0-44.9, ADULT: Status: ACTIVE | Noted: 2021-06-24

## 2022-05-17 PROBLEM — E88.81 METABOLIC SYNDROME: Status: ACTIVE | Noted: 2022-04-29

## 2022-05-17 NOTE — ASSESSMENT
[FreeTextEntry1] : BARIATRIC SURGERY HISTORY: none\par OBESITY CO-MORBIDITIES: dm, hld \par ANTI-OBESITY MEDICATIONS: none\par OBESITY MEDICATION SIDE EFFECTS: n/a \par  \par Plan: \par Focus on  whole food, high fiber, lean animal protein\par avoid added fat, sugar, refined/processed carbs\par keep food journal \par track steps\par  medication from pharmacy and start \par \par f/u 2-3 weeks in person

## 2022-05-17 NOTE — HISTORY OF PRESENT ILLNESS
[FreeTextEntry1] : This is a 27 year old male  being seen obesity followup visit.\par \par He and his family prepping more food and  eating out less, less leftovers to eat for breakfast \par B: protein bars\par L: salads, greek food gyro with WW bread \par D: meatloaf, zucchini linguini and red sauce\par No fried food, all baked \par Patient states he is snacking less. Snacks during the day on cashews or fruit, avocado oil popcorn \par Snacked 2-3x/before bed \par Drinking unsweetened tea now\par \par \par Patient states he has been walking more, at least 4-5 days for an hour \par \par He has not picked up his ozempic yet, plans to do it today and start this week

## 2022-06-09 ENCOUNTER — APPOINTMENT (OUTPATIENT)
Dept: BARIATRICS/WEIGHT MGMT | Facility: CLINIC | Age: 27
End: 2022-06-09

## 2022-06-09 DIAGNOSIS — J45.909 UNSPECIFIED ASTHMA, UNCOMPLICATED: ICD-10-CM

## 2022-07-02 ENCOUNTER — NON-APPOINTMENT (OUTPATIENT)
Age: 27
End: 2022-07-02

## 2022-09-29 ENCOUNTER — NON-APPOINTMENT (OUTPATIENT)
Age: 27
End: 2022-09-29

## 2022-10-06 ENCOUNTER — APPOINTMENT (OUTPATIENT)
Dept: INTERNAL MEDICINE | Facility: CLINIC | Age: 27
End: 2022-10-06

## 2022-10-06 VITALS
RESPIRATION RATE: 17 BRPM | TEMPERATURE: 97.3 F | HEIGHT: 69 IN | HEART RATE: 90 BPM | DIASTOLIC BLOOD PRESSURE: 80 MMHG | WEIGHT: 270 LBS | BODY MASS INDEX: 39.99 KG/M2 | OXYGEN SATURATION: 98 % | SYSTOLIC BLOOD PRESSURE: 120 MMHG

## 2022-10-06 DIAGNOSIS — J01.90 ACUTE SINUSITIS, UNSPECIFIED: ICD-10-CM

## 2022-10-06 PROCEDURE — 99214 OFFICE O/P EST MOD 30 MIN: CPT

## 2022-10-06 RX ORDER — ALBUTEROL SULFATE 90 UG/1
108 (90 BASE) INHALANT RESPIRATORY (INHALATION)
Qty: 1 | Refills: 2 | Status: ACTIVE | COMMUNITY
Start: 2022-10-06 | End: 1900-01-01

## 2022-10-06 NOTE — HISTORY OF PRESENT ILLNESS
[FreeTextEntry8] : cc: sinusitis\par \par c/o congestion, cough, ear pain for a week. Has been using a nebulizer for asthma. He went to urgent care and covid and flu are negative. Had covid vaccine and had covid a while ago. Had fever to > 101.

## 2022-10-06 NOTE — PHYSICAL EXAM
[Normal TMs] : both tympanic membranes were normal [Normal] : affect was normal and insight and judgment were intact [de-identified] : coughing

## 2022-10-11 ENCOUNTER — APPOINTMENT (OUTPATIENT)
Dept: INTERNAL MEDICINE | Facility: CLINIC | Age: 27
End: 2022-10-11

## 2022-10-11 VITALS
OXYGEN SATURATION: 98 % | DIASTOLIC BLOOD PRESSURE: 60 MMHG | SYSTOLIC BLOOD PRESSURE: 110 MMHG | RESPIRATION RATE: 18 BRPM | HEART RATE: 83 BPM | TEMPERATURE: 97.2 F

## 2022-10-11 PROCEDURE — 99214 OFFICE O/P EST MOD 30 MIN: CPT

## 2022-10-11 RX ORDER — BECLOMETHASONE DIPROPIONATE HFA 80 UG/1
80 AEROSOL, METERED RESPIRATORY (INHALATION) TWICE DAILY
Qty: 1 | Refills: 0 | Status: ACTIVE | COMMUNITY
Start: 2022-10-11 | End: 1900-01-01

## 2022-10-11 NOTE — REVIEW OF SYSTEMS
[Cough] : cough [Fever] : no fever [Chills] : no chills [Night Sweats] : no night sweats [Chest Pain] : no chest pain [Palpitations] : no palpitations [Lower Ext Edema] : no lower extremity edema [Dyspnea on Exertion] : not dyspnea on exertion [Abdominal Pain] : no abdominal pain [Nausea] : no nausea [Vomiting] : no vomiting [Dysuria] : no dysuria

## 2022-10-11 NOTE — ASSESSMENT
[FreeTextEntry1] : Bronchitis: Symptoms persistent. Denies fever. Lungs clear. Start promethazine PRN for cough and qvar BID until cough resolved. Follow-up 2 weeks if not improved. \par

## 2022-10-11 NOTE — HISTORY OF PRESENT ILLNESS
[FreeTextEntry8] : 27M presents for follow-up of asthmatic bronchitis. Reports persistent cough but denies fever, chills. Has dyspnea when coughing but otherwise no dyspnea, CP. Was given augmentin and prednisone last week and symptoms except for cough resolved. \par

## 2022-10-11 NOTE — PHYSICAL EXAM
[No Acute Distress] : no acute distress [Normal Sclera/Conjunctiva] : normal sclera/conjunctiva [PERRL] : pupils equal round and reactive to light [EOMI] : extraocular movements intact [No Respiratory Distress] : no respiratory distress  [No Accessory Muscle Use] : no accessory muscle use [Clear to Auscultation] : lungs were clear to auscultation bilaterally [Normal Rate] : normal rate  [Regular Rhythm] : with a regular rhythm [Normal S1, S2] : normal S1 and S2 [No Murmur] : no murmur heard [Soft] : abdomen soft [Non Tender] : non-tender [Non-distended] : non-distended [Normal Bowel Sounds] : normal bowel sounds [de-identified] : PND

## 2022-12-16 ENCOUNTER — NON-APPOINTMENT (OUTPATIENT)
Age: 27
End: 2022-12-16

## 2023-01-18 ENCOUNTER — APPOINTMENT (OUTPATIENT)
Dept: INTERNAL MEDICINE | Facility: CLINIC | Age: 28
End: 2023-01-18
Payer: COMMERCIAL

## 2023-01-18 VITALS
DIASTOLIC BLOOD PRESSURE: 84 MMHG | WEIGHT: 265 LBS | RESPIRATION RATE: 14 BRPM | HEART RATE: 102 BPM | SYSTOLIC BLOOD PRESSURE: 120 MMHG | TEMPERATURE: 99.5 F | BODY MASS INDEX: 39.25 KG/M2 | OXYGEN SATURATION: 98 % | HEIGHT: 69 IN

## 2023-01-18 DIAGNOSIS — E11.9 TYPE 2 DIABETES MELLITUS W/OUT COMPLICATIONS: ICD-10-CM

## 2023-01-18 DIAGNOSIS — E78.5 HYPERLIPIDEMIA, UNSPECIFIED: ICD-10-CM

## 2023-01-18 DIAGNOSIS — Z00.00 ENCOUNTER FOR GENERAL ADULT MEDICAL EXAMINATION W/OUT ABNORMAL FINDINGS: ICD-10-CM

## 2023-01-18 PROBLEM — J45.909 ACUTE ASTHMATIC BRONCHITIS: Status: RESOLVED | Noted: 2021-07-02 | Resolved: 2023-01-18

## 2023-01-18 PROCEDURE — 99395 PREV VISIT EST AGE 18-39: CPT

## 2023-01-18 RX ORDER — NAPROXEN 500 MG/1
500 TABLET ORAL
Qty: 30 | Refills: 0 | Status: DISCONTINUED | COMMUNITY
Start: 2022-04-26 | End: 2023-01-18

## 2023-01-18 RX ORDER — PROMETHAZINE HYDROCHLORIDE AND DEXTROMETHORPHAN HYDROBROMIDE ORAL SOLUTION 15; 6.25 MG/5ML; MG/5ML
6.25-15 SOLUTION ORAL 3 TIMES DAILY
Qty: 120 | Refills: 0 | Status: DISCONTINUED | COMMUNITY
Start: 2022-10-11 | End: 2023-01-18

## 2023-01-18 RX ORDER — AMOXICILLIN AND CLAVULANATE POTASSIUM 875; 125 MG/1; MG/1
875-125 TABLET, COATED ORAL TWICE DAILY
Qty: 14 | Refills: 0 | Status: DISCONTINUED | COMMUNITY
Start: 2022-10-06 | End: 2023-01-18

## 2023-01-18 RX ORDER — BENZONATATE 100 MG/1
100 CAPSULE ORAL 3 TIMES DAILY
Qty: 20 | Refills: 0 | Status: DISCONTINUED | COMMUNITY
Start: 2022-10-06 | End: 2023-01-18

## 2023-01-18 RX ORDER — PREDNISONE 20 MG/1
20 TABLET ORAL DAILY
Qty: 5 | Refills: 0 | Status: DISCONTINUED | COMMUNITY
Start: 2022-10-06 | End: 2023-01-18

## 2023-01-18 RX ORDER — CAMPHOR 0.45 %
25 GEL (GRAM) TOPICAL
Qty: 9 | Refills: 0 | Status: DISCONTINUED | COMMUNITY
Start: 2020-10-09 | End: 2023-01-18

## 2023-01-18 NOTE — ASSESSMENT
[FreeTextEntry1] : DM2: Discussed restarting ozempic. Check a1c. \par Depression: Declined therapy referral, medication. Controlled. \par HCM: Check labs. Will discuss results. \par HLD: Check lipids. \par

## 2023-01-18 NOTE — REVIEW OF SYSTEMS

## 2023-01-19 DIAGNOSIS — E78.1 PURE HYPERGLYCERIDEMIA: ICD-10-CM

## 2023-01-19 LAB
ALBUMIN SERPL ELPH-MCNC: 4.8 G/DL
ALP BLD-CCNC: 69 U/L
ALT SERPL-CCNC: 82 U/L
ANION GAP SERPL CALC-SCNC: 13 MMOL/L
AST SERPL-CCNC: 43 U/L
BASOPHILS # BLD AUTO: 0.18 K/UL
BASOPHILS NFR BLD AUTO: 1.7 %
BILIRUB SERPL-MCNC: 0.7 MG/DL
BUN SERPL-MCNC: 18 MG/DL
CALCIUM SERPL-MCNC: 9.6 MG/DL
CHLORIDE SERPL-SCNC: 100 MMOL/L
CHOLEST SERPL-MCNC: 221 MG/DL
CO2 SERPL-SCNC: 25 MMOL/L
CREAT SERPL-MCNC: 1 MG/DL
EGFR: 107 ML/MIN/1.73M2
EOSINOPHIL # BLD AUTO: 0.93 K/UL
EOSINOPHIL NFR BLD AUTO: 8.8 %
ESTIMATED AVERAGE GLUCOSE: 151 MG/DL
GLUCOSE SERPL-MCNC: 73 MG/DL
HBA1C MFR BLD HPLC: 6.9 %
HCT VFR BLD CALC: 46.9 %
HDLC SERPL-MCNC: 34 MG/DL
HGB BLD-MCNC: 15.5 G/DL
IMM GRANULOCYTES NFR BLD AUTO: 0.4 %
LDLC SERPL CALC-MCNC: NORMAL MG/DL
LYMPHOCYTES # BLD AUTO: 3.77 K/UL
LYMPHOCYTES NFR BLD AUTO: 35.6 %
MAN DIFF?: NORMAL
MCHC RBC-ENTMCNC: 27.7 PG
MCHC RBC-ENTMCNC: 33 GM/DL
MCV RBC AUTO: 83.9 FL
MONOCYTES # BLD AUTO: 0.87 K/UL
MONOCYTES NFR BLD AUTO: 8.2 %
NEUTROPHILS # BLD AUTO: 4.79 K/UL
NEUTROPHILS NFR BLD AUTO: 45.3 %
NONHDLC SERPL-MCNC: 187 MG/DL
PLATELET # BLD AUTO: 380 K/UL
POTASSIUM SERPL-SCNC: 5.8 MMOL/L
PROT SERPL-MCNC: 7.7 G/DL
RBC # BLD: 5.59 M/UL
RBC # FLD: 12.5 %
SODIUM SERPL-SCNC: 137 MMOL/L
TRIGL SERPL-MCNC: 650 MG/DL
TSH SERPL-ACNC: 3.86 UIU/ML
WBC # FLD AUTO: 10.58 K/UL

## 2023-05-15 RX ORDER — SEMAGLUTIDE 0.68 MG/ML
2 INJECTION, SOLUTION SUBCUTANEOUS
Qty: 1 | Refills: 1 | Status: ACTIVE | COMMUNITY
Start: 2022-04-22 | End: 1900-01-01

## 2023-12-25 ENCOUNTER — NON-APPOINTMENT (OUTPATIENT)
Age: 28
End: 2023-12-25

## 2024-05-20 ENCOUNTER — NON-APPOINTMENT (OUTPATIENT)
Age: 29
End: 2024-05-20

## 2024-08-27 ENCOUNTER — NON-APPOINTMENT (OUTPATIENT)
Age: 29
End: 2024-08-27

## 2024-08-28 ENCOUNTER — RESULT REVIEW (OUTPATIENT)
Age: 29
End: 2024-08-28

## 2024-08-29 ENCOUNTER — APPOINTMENT (OUTPATIENT)
Dept: ORTHOPEDIC SURGERY | Facility: CLINIC | Age: 29
End: 2024-08-29
Payer: COMMERCIAL

## 2024-08-29 VITALS — HEIGHT: 69 IN | BODY MASS INDEX: 39.25 KG/M2 | WEIGHT: 265 LBS

## 2024-08-29 DIAGNOSIS — S93.602A UNSPECIFIED SPRAIN OF LEFT FOOT, INITIAL ENCOUNTER: ICD-10-CM

## 2024-08-29 PROCEDURE — 99203 OFFICE O/P NEW LOW 30 MIN: CPT

## 2024-08-29 NOTE — HISTORY OF PRESENT ILLNESS
[de-identified] : REMY SALES  a 29 year M  here for evaluation of his left foot/ankle. Patient states he fell when leaving his house on 8/27/24.  He went to [facility] and was told there were not fractures.  He has been wb in a post op shoe.  He reports having some pain and intermittent N/T in the foot which has subsided as of yesterday.  No prior problems in that area.

## 2024-08-29 NOTE — PHYSICAL EXAM
[Left] : left foot and ankle [NL (40)] : plantar flexion 40 degrees [NL 30)] : inversion 30 degrees [NL (20)] : eversion 20 degrees [5___] : dorsiflexion 5[unfilled]/5 [2+] : posterior tibialis pulse: 2+ [Normal] : saphenous nerve sensation normal [4___] : eversion 4[unfilled]/5 [] : non-antalgic [de-identified] : Patient states his mild weakness is chronic.

## 2024-08-29 NOTE — DATA REVIEWED
[Outside X-rays] : outside x-rays [Left] : left [Foot] : foot [I reviewed the films/CD] : I reviewed the films/CD [FreeTextEntry1] : [facility] 8/28/24:  No fractures/subluxations seen.  No acute pathology.